# Patient Record
Sex: FEMALE | Race: WHITE
[De-identification: names, ages, dates, MRNs, and addresses within clinical notes are randomized per-mention and may not be internally consistent; named-entity substitution may affect disease eponyms.]

---

## 2018-11-14 ENCOUNTER — HOSPITAL ENCOUNTER (EMERGENCY)
Dept: HOSPITAL 80 - CED | Age: 33
Discharge: HOME | End: 2018-11-14
Payer: COMMERCIAL

## 2018-11-14 VITALS — SYSTOLIC BLOOD PRESSURE: 101 MMHG | DIASTOLIC BLOOD PRESSURE: 70 MMHG

## 2018-11-14 DIAGNOSIS — E86.9: ICD-10-CM

## 2018-11-14 DIAGNOSIS — M79.7: ICD-10-CM

## 2018-11-14 DIAGNOSIS — F41.9: ICD-10-CM

## 2018-11-14 DIAGNOSIS — F32.9: ICD-10-CM

## 2018-11-14 DIAGNOSIS — F12.188: Primary | ICD-10-CM

## 2018-11-14 DIAGNOSIS — F31.9: ICD-10-CM

## 2018-11-14 NOTE — EDPHY
H & P


Stated Complaint: n/v started 2 weeks ago, seen at [facility] yesterday,


Time Seen by Provider: 11/14/18 14:55


HPI/ROS: 





Chief Complaint:  Nausea, vomiting, abdominal pain





HPI:  33-year-old woman who is been having intermittent abdominal pain, nausea 

and vomiting for the last 2 weeks.  Symptoms began when she 1st had a sore 

throat.  He initially thought she had strep but does test came back negative.  

She has been having abdominal pain nausea vomiting daily.  She was seen at 

[facility] yesterday where she had lab tests and a CT scan 

of the abdomen.  These were reportedly negative.  She was sent home with oral 

antiemetics.  She is continuing to have nausea vomiting abdominal cramping.  

Pain is described epigastric constant.  Is a 7/10.  There are no aggravating or 

alleviating factors.  She has not had similar symptoms in the past.  She does 

admit that she smokes cannabis daily and has been doing so for many years.  No 

fevers or chills.  No blood in her vomit.  No dark tarry stools.  No urinary 

urgency and frequency.  No diarrhea.





ROS:  10 systems were reviewed and were negative except those elements noted in 

the HPI.





PMH:  Bipolar disorder, fibromyalgia





Social History: No smoking, occasional alcohol, daily cannabis use





Family History: non-contributory





Physical Exam:


Gen: Awake, Alert, No Distress


HEENT:  


     Nose: no rhinorrhea


     Eyes: PERRLA, EOMI


     Mouth:  Dry mucosa 


Neck: Supple, no JVD


Chest: nontender, lungs clear to auscultation


Heart: S1, S2 normal, no murmur


Abd: Soft, mild epigastric tenderness, no guarding


Back: no CVA tenderness, no midline tenderness 


Ext: no edema, non-tender


Skin: no rash


Neuro: CN II-XII intact, Sensation grossly intact, Strength 5/5 in bilateral 

upper and lower extremities








- Personal History


LMP (Females 10-55): 22-28 Days Ago





- Medical/Surgical History


Other PMH: ANXIETY, DEPRESSION, C-=SECT, FIBROMYALGIA





- Social History


Smoking Status: Never smoked


Constitutional: 


 Initial Vital Signs











Temperature (C)  37.6 C   11/14/18 14:44


 


Heart Rate  100   11/14/18 14:44


 


Respiratory Rate  18   11/14/18 14:44


 


Blood Pressure  123/76 H  11/14/18 14:44


 


O2 Sat (%)  95   11/14/18 14:44








 











O2 Delivery Mode               Room Air














Allergies/Adverse Reactions: 


 





Tetanus Vaccines and Toxoid [Tetanus] Allergy (Verified 11/14/18 14:42)


 


PAPER TAPE Adverse Reaction (Uncoded 11/14/18 14:42)


 








Home Medications: 














 Medication  Instructions  Recorded


 


AMITRIPTYLINE HCL  09/15/16


 


Abilify  09/15/16


 


Cymbalta  09/15/16


 


Ondansetron HCl [Zofran] 4 mg PO Q4-6PRN PRN #10 tablet 09/15/16


 


Promethazine HCl [Phenergan 12.5mg 12.5 mg PO BID #10 tablet 09/15/16





tab]  


 


Xanax Xr  09/15/16


 


busPIRone  09/15/16














Medical Decision Making


ED Course/Re-evaluation: 





I have obtained a copy of the patient's records from [facility] from 

yesterday.  CT scan at that time was normal.  Her CBC was normal.  Her 

comprehensive metabolic panel was normal.  Patient presenting with 2 weeks of 

abdominal pain nausea vomiting with normal labs and normal CT scan.  She does 

admit to daily cannabis use for many years.  Symptoms are consistent with 

cannabinoid hyperemesis.  Will give her IV fluid hydration, will recheck her 

comprehensive metabolic panel to make sure that her renal function is 

appropriate.  She will be given Haldol and Benadryl IV for symptomatic control 

and re-evaluated.





Improved after IV fluids antiemetics.  She is tolerating p. O..  Pain is 

resolved.  Will discharge with follow-up with primary care physician as 

outlined above.  





- Data Points


Laboratory Results: 


 











  11/14/18





  15:21


 


POC Sodium  144 mEq/L mEq/L





   (135-145) 


 


POC Potassium  3.2 mEq/L L mEq/L





   (3.3-5.0) 


 


POC Chloride  99.0 mEq/L mEq/L





   () 


 


POC Total CO2  28 mEq/L mEq/L





   (22-31) 


 


POC BUN  11 mg/dL mg/dL





   (7-23) 


 


POC Creatinine  1.0 mg/dL mg/dL





   (0.6-1.0) 


 


POC Glucose  105 mg/dL H mg/dL





   () 


 


POC Calcium  9.7 mg/dL mg/dL





   (8.5-10.4) 











Medications Given: 


 








Discontinued Medications





Diphenhydramine HCl (Benadryl Injection)  25 mg IVP EDNOW ONE


   Stop: 11/14/18 15:12


   Last Admin: 11/14/18 15:24 Dose:  25 mg


Haloperidol Lactate (Haldol Injection)  2.5 mg IVP EDNOW ONE


   Stop: 11/14/18 15:12


   Last Admin: 11/14/18 15:26 Dose:  2.5 mg


Sodium Chloride (Ns)  1,000 mls @ 0 mls/hr IV ONCE ONE; Wide Open


   PRN Reason: Protocol


   Stop: 11/14/18 15:12


   Last Admin: 11/14/18 15:24 Dose:  1,000 mls


Sodium Chloride (Ns)  1,000 mls @ 0 mls/hr IV ONCE ONE; Wide Open


   PRN Reason: Protocol


   Stop: 11/14/18 15:13


   Last Admin: 11/14/18 16:05 Dose:  1,000 mls





Point of Care Test Results: 


 Chemistry











  11/14/18





  15:21


 


POC Sodium  144 mEq/L mEq/L





   (135-145) 


 


POC Potassium  3.2 mEq/L L mEq/L





   (3.3-5.0) 


 


POC Chloride  99.0 mEq/L mEq/L





   () 


 


POC Total CO2  28 mEq/L mEq/L





   (22-31) 


 


POC BUN  11 mg/dL mg/dL





   (7-23) 


 


POC Creatinine  1.0 mg/dL mg/dL





   (0.6-1.0) 


 


POC Glucose  105 mg/dL H mg/dL





   () 


 


POC Calcium  9.7 mg/dL mg/dL





   (8.5-10.4) 














Departure





- Departure


Disposition: Home, Routine, Self-Care


Clinical Impression: 


 Cannabinoid hyperemesis syndrome





Condition: Good


Instructions:  Acute Nausea and Vomiting (ED)


Additional Instructions: 


I believe your symptoms have resulted from your use of cannabis.  I recommend 

you abstain from any cannabis use for at least 6 to 8 weeks to determine if 

this results in improvement in your symptoms.


Follow up with primary care physician in 4-5 days for further evaluation.


Return to the emergency department for uncontrolled nausea vomiting, 

lightheadedness, fainting, uncontrolled abdominal pain, or any other concerns.


Referrals: 


Teagan Sagastume MD [Primary Care Provider] - As per Instructions